# Patient Record
Sex: MALE | Race: OTHER | Employment: FULL TIME | URBAN - METROPOLITAN AREA
[De-identification: names, ages, dates, MRNs, and addresses within clinical notes are randomized per-mention and may not be internally consistent; named-entity substitution may affect disease eponyms.]

---

## 2017-01-25 ENCOUNTER — APPOINTMENT (OUTPATIENT)
Dept: GENERAL RADIOLOGY | Age: 47
End: 2017-01-25
Attending: EMERGENCY MEDICINE
Payer: SELF-PAY

## 2017-01-25 ENCOUNTER — APPOINTMENT (OUTPATIENT)
Dept: CT IMAGING | Age: 47
End: 2017-01-25
Attending: EMERGENCY MEDICINE
Payer: SELF-PAY

## 2017-01-25 ENCOUNTER — APPOINTMENT (OUTPATIENT)
Dept: ULTRASOUND IMAGING | Age: 47
End: 2017-01-25
Attending: EMERGENCY MEDICINE
Payer: SELF-PAY

## 2017-01-25 ENCOUNTER — HOSPITAL ENCOUNTER (EMERGENCY)
Age: 47
Discharge: HOME | End: 2017-01-25
Attending: EMERGENCY MEDICINE
Payer: SELF-PAY

## 2017-01-25 VITALS
TEMPERATURE: 98.7 F | HEART RATE: 79 BPM | RESPIRATION RATE: 15 BRPM | DIASTOLIC BLOOD PRESSURE: 88 MMHG | SYSTOLIC BLOOD PRESSURE: 137 MMHG | OXYGEN SATURATION: 95 %

## 2017-01-25 DIAGNOSIS — R11.2 NAUSEA AND VOMITING, INTRACTABILITY OF VOMITING NOT SPECIFIED, UNSPECIFIED VOMITING TYPE: ICD-10-CM

## 2017-01-25 DIAGNOSIS — E86.0 DEHYDRATION: ICD-10-CM

## 2017-01-25 DIAGNOSIS — R10.11 ABDOMINAL PAIN, RIGHT UPPER QUADRANT: ICD-10-CM

## 2017-01-25 DIAGNOSIS — R10.12 ABDOMINAL PAIN, LUQ (LEFT UPPER QUADRANT): Primary | ICD-10-CM

## 2017-01-25 DIAGNOSIS — G47.00 INSOMNIA, UNSPECIFIED TYPE: ICD-10-CM

## 2017-01-25 LAB
ALBUMIN SERPL BCP-MCNC: 3.8 G/DL (ref 3.5–5)
ALBUMIN/GLOB SERPL: 0.9 {RATIO} (ref 1.1–2.2)
ALP SERPL-CCNC: 101 U/L (ref 45–117)
ALT SERPL-CCNC: 32 U/L (ref 12–78)
ANION GAP BLD CALC-SCNC: 16 MMOL/L (ref 5–15)
APPEARANCE UR: CLEAR
APTT PPP: 26.6 SEC (ref 22.1–32.5)
AST SERPL W P-5'-P-CCNC: 29 U/L (ref 15–37)
BASOPHILS # BLD AUTO: 0 K/UL (ref 0–0.1)
BASOPHILS # BLD: 0 % (ref 0–1)
BILIRUB SERPL-MCNC: 0.6 MG/DL (ref 0.2–1)
BILIRUB UR QL: NEGATIVE
BUN SERPL-MCNC: 13 MG/DL (ref 6–20)
BUN/CREAT SERPL: 14 (ref 12–20)
CALCIUM SERPL-MCNC: 8.5 MG/DL (ref 8.5–10.1)
CHLORIDE SERPL-SCNC: 101 MMOL/L (ref 97–108)
CK MB CFR SERPL CALC: 0.8 % (ref 0–2.5)
CK MB SERPL-MCNC: 2.7 NG/ML (ref 5–25)
CK SERPL-CCNC: 349 U/L (ref 39–308)
CO2 SERPL-SCNC: 23 MMOL/L (ref 21–32)
COLOR UR: NORMAL
CREAT SERPL-MCNC: 0.93 MG/DL (ref 0.7–1.3)
EOSINOPHIL # BLD: 0.1 K/UL (ref 0–0.4)
EOSINOPHIL NFR BLD: 1 % (ref 0–7)
ERYTHROCYTE [DISTWIDTH] IN BLOOD BY AUTOMATED COUNT: 15.6 % (ref 11.5–14.5)
ETHANOL SERPL-MCNC: 138 MG/DL
GLOBULIN SER CALC-MCNC: 4.4 G/DL (ref 2–4)
GLUCOSE SERPL-MCNC: 123 MG/DL (ref 65–100)
GLUCOSE UR STRIP.AUTO-MCNC: NEGATIVE MG/DL
HCT VFR BLD AUTO: 40 % (ref 36.6–50.3)
HGB BLD-MCNC: 14.2 G/DL (ref 12.1–17)
HGB UR QL STRIP: NEGATIVE
INR PPP: 1.1 (ref 0.9–1.1)
KETONES UR QL STRIP.AUTO: NEGATIVE MG/DL
LACTATE SERPL-SCNC: 2.2 MMOL/L (ref 0.4–2)
LACTATE SERPL-SCNC: 3.2 MMOL/L (ref 0.4–2)
LEUKOCYTE ESTERASE UR QL STRIP.AUTO: NEGATIVE
LIPASE SERPL-CCNC: 101 U/L (ref 73–393)
LYMPHOCYTES # BLD AUTO: 36 % (ref 12–49)
LYMPHOCYTES # BLD: 2.1 K/UL (ref 0.8–3.5)
MCH RBC QN AUTO: 28.3 PG (ref 26–34)
MCHC RBC AUTO-ENTMCNC: 35.5 G/DL (ref 30–36.5)
MCV RBC AUTO: 79.8 FL (ref 80–99)
MONOCYTES # BLD: 0.5 K/UL (ref 0–1)
MONOCYTES NFR BLD AUTO: 8 % (ref 5–13)
NEUTS SEG # BLD: 3.1 K/UL (ref 1.8–8)
NEUTS SEG NFR BLD AUTO: 55 % (ref 32–75)
NITRITE UR QL STRIP.AUTO: NEGATIVE
PH UR STRIP: 5.5 [PH] (ref 5–8)
PLATELET # BLD AUTO: 244 K/UL (ref 150–400)
POTASSIUM SERPL-SCNC: 3.4 MMOL/L (ref 3.5–5.1)
PROT SERPL-MCNC: 8.2 G/DL (ref 6.4–8.2)
PROT UR STRIP-MCNC: NEGATIVE MG/DL
PROTHROMBIN TIME: 11.6 SEC (ref 9–11.1)
RBC # BLD AUTO: 5.01 M/UL (ref 4.1–5.7)
SODIUM SERPL-SCNC: 140 MMOL/L (ref 136–145)
SP GR UR REFRACTOMETRY: 1.01 (ref 1–1.03)
THERAPEUTIC RANGE,PTTT: NORMAL SECS (ref 58–77)
TROPONIN I SERPL-MCNC: <0.04 NG/ML
UROBILINOGEN UR QL STRIP.AUTO: 0.2 EU/DL (ref 0.2–1)
WBC # BLD AUTO: 5.7 K/UL (ref 4.1–11.1)

## 2017-01-25 PROCEDURE — 36415 COLL VENOUS BLD VENIPUNCTURE: CPT | Performed by: EMERGENCY MEDICINE

## 2017-01-25 PROCEDURE — 80053 COMPREHEN METABOLIC PANEL: CPT | Performed by: EMERGENCY MEDICINE

## 2017-01-25 PROCEDURE — 83605 ASSAY OF LACTIC ACID: CPT | Performed by: EMERGENCY MEDICINE

## 2017-01-25 PROCEDURE — 96374 THER/PROPH/DIAG INJ IV PUSH: CPT

## 2017-01-25 PROCEDURE — 84484 ASSAY OF TROPONIN QUANT: CPT | Performed by: EMERGENCY MEDICINE

## 2017-01-25 PROCEDURE — 96375 TX/PRO/DX INJ NEW DRUG ADDON: CPT

## 2017-01-25 PROCEDURE — 99285 EMERGENCY DEPT VISIT HI MDM: CPT

## 2017-01-25 PROCEDURE — 80307 DRUG TEST PRSMV CHEM ANLYZR: CPT | Performed by: EMERGENCY MEDICINE

## 2017-01-25 PROCEDURE — 82550 ASSAY OF CK (CPK): CPT | Performed by: EMERGENCY MEDICINE

## 2017-01-25 PROCEDURE — 74011636320 HC RX REV CODE- 636/320: Performed by: EMERGENCY MEDICINE

## 2017-01-25 PROCEDURE — 74011250636 HC RX REV CODE- 250/636

## 2017-01-25 PROCEDURE — 93005 ELECTROCARDIOGRAM TRACING: CPT

## 2017-01-25 PROCEDURE — 74022 RADEX COMPL AQT ABD SERIES: CPT

## 2017-01-25 PROCEDURE — 74177 CT ABD & PELVIS W/CONTRAST: CPT

## 2017-01-25 PROCEDURE — 85730 THROMBOPLASTIN TIME PARTIAL: CPT | Performed by: EMERGENCY MEDICINE

## 2017-01-25 PROCEDURE — 83690 ASSAY OF LIPASE: CPT | Performed by: EMERGENCY MEDICINE

## 2017-01-25 PROCEDURE — 74011250636 HC RX REV CODE- 250/636: Performed by: EMERGENCY MEDICINE

## 2017-01-25 PROCEDURE — 96361 HYDRATE IV INFUSION ADD-ON: CPT

## 2017-01-25 PROCEDURE — 85610 PROTHROMBIN TIME: CPT | Performed by: EMERGENCY MEDICINE

## 2017-01-25 PROCEDURE — 76700 US EXAM ABDOM COMPLETE: CPT

## 2017-01-25 PROCEDURE — 96376 TX/PRO/DX INJ SAME DRUG ADON: CPT

## 2017-01-25 PROCEDURE — 81003 URINALYSIS AUTO W/O SCOPE: CPT | Performed by: EMERGENCY MEDICINE

## 2017-01-25 PROCEDURE — 85025 COMPLETE CBC W/AUTO DIFF WBC: CPT | Performed by: EMERGENCY MEDICINE

## 2017-01-25 RX ORDER — SODIUM CHLORIDE 9 MG/ML
50 INJECTION, SOLUTION INTRAVENOUS
Status: COMPLETED | OUTPATIENT
Start: 2017-01-25 | End: 2017-01-25

## 2017-01-25 RX ORDER — ONDANSETRON 2 MG/ML
4 INJECTION INTRAMUSCULAR; INTRAVENOUS
Status: COMPLETED | OUTPATIENT
Start: 2017-01-25 | End: 2017-01-25

## 2017-01-25 RX ORDER — KETOROLAC TROMETHAMINE 30 MG/ML
30 INJECTION, SOLUTION INTRAMUSCULAR; INTRAVENOUS
Status: COMPLETED | OUTPATIENT
Start: 2017-01-25 | End: 2017-01-25

## 2017-01-25 RX ORDER — MORPHINE SULFATE 4 MG/ML
INJECTION, SOLUTION INTRAMUSCULAR; INTRAVENOUS
Status: COMPLETED
Start: 2017-01-25 | End: 2017-01-25

## 2017-01-25 RX ORDER — FAMOTIDINE 20 MG/1
20 TABLET, FILM COATED ORAL 2 TIMES DAILY
Qty: 20 TAB | Refills: 0 | Status: SHIPPED | OUTPATIENT
Start: 2017-01-25

## 2017-01-25 RX ORDER — DICYCLOMINE HYDROCHLORIDE 20 MG/1
20 TABLET ORAL EVERY 6 HOURS
Qty: 20 TAB | Refills: 0 | Status: SHIPPED | OUTPATIENT
Start: 2017-01-25 | End: 2017-01-30

## 2017-01-25 RX ORDER — HYDROCODONE BITARTRATE AND ACETAMINOPHEN 5; 325 MG/1; MG/1
1 TABLET ORAL
Qty: 20 TAB | Refills: 0 | Status: SHIPPED | OUTPATIENT
Start: 2017-01-25

## 2017-01-25 RX ORDER — SODIUM CHLORIDE 0.9 % (FLUSH) 0.9 %
10 SYRINGE (ML) INJECTION
Status: COMPLETED | OUTPATIENT
Start: 2017-01-25 | End: 2017-01-25

## 2017-01-25 RX ORDER — MORPHINE SULFATE 4 MG/ML
4 INJECTION, SOLUTION INTRAMUSCULAR; INTRAVENOUS
Status: COMPLETED | OUTPATIENT
Start: 2017-01-25 | End: 2017-01-25

## 2017-01-25 RX ORDER — ONDANSETRON 4 MG/1
4 TABLET, ORALLY DISINTEGRATING ORAL
Qty: 10 TAB | Refills: 0 | Status: SHIPPED | OUTPATIENT
Start: 2017-01-25

## 2017-01-25 RX ADMIN — SODIUM CHLORIDE 1000 ML: 900 INJECTION, SOLUTION INTRAVENOUS at 17:22

## 2017-01-25 RX ADMIN — ONDANSETRON 4 MG: 2 INJECTION INTRAMUSCULAR; INTRAVENOUS at 21:32

## 2017-01-25 RX ADMIN — SODIUM CHLORIDE 1000 ML: 900 INJECTION, SOLUTION INTRAVENOUS at 18:43

## 2017-01-25 RX ADMIN — DIATRIZOATE MEGLUMINE AND DIATRIZOATE SODIUM 30 ML: 600; 100 SOLUTION ORAL; RECTAL at 19:59

## 2017-01-25 RX ADMIN — Medication 10 ML: at 22:07

## 2017-01-25 RX ADMIN — SODIUM CHLORIDE 50 ML/HR: 900 INJECTION, SOLUTION INTRAVENOUS at 22:05

## 2017-01-25 RX ADMIN — IOPAMIDOL 100 ML: 755 INJECTION, SOLUTION INTRAVENOUS at 22:05

## 2017-01-25 RX ADMIN — ONDANSETRON 4 MG: 2 INJECTION INTRAMUSCULAR; INTRAVENOUS at 17:22

## 2017-01-25 RX ADMIN — KETOROLAC TROMETHAMINE 30 MG: 30 INJECTION, SOLUTION INTRAMUSCULAR at 17:22

## 2017-01-25 RX ADMIN — MORPHINE SULFATE 4 MG: 4 INJECTION, SOLUTION INTRAMUSCULAR; INTRAVENOUS at 21:32

## 2017-01-25 NOTE — ED NOTES
Pt placed in the room and placed in gown. Pt states he has had n/v and abdominal pain x 1 week. Pt states he has had approx 10 beers today and has h/o DM, per interpretor.

## 2017-01-25 NOTE — ED NOTES
Unable to communicate with pt, pt does not speak any english. Will move pt into room 22 once it is available in order to plug in blue phone to better communicate with the pt.

## 2017-01-25 NOTE — LETTER
[address] 
[facility] EMERGENCY DEPT 
[address] [place] [address] [address] [id number] [phone] Work/School Note Date: 1/25/2017 To Whom It May concern: 
 
Lee Barber was seen and treated today in the emergency room by the following provider(s): 
Attending Provider: vEer Santiago MD. Lee Barber may return to work on 01/27/2017. Sincerely, Ever Santiago MD

## 2017-01-25 NOTE — ED PROVIDER NOTES
HPI Comments: Wally Avila, 40 y.o. Male with PMHx of DM presents via EMS to ED [facility] ED with cc of severe epigastric pain x 1 week. Pt does not speak English, Dr. Dc Wong was able to communicate with the pt in Sudanese. Per EMS, pt reports drinking beer today. Pt notes that the pain has worsened significantly today, with an episode of vomiting, prompting him to seek treatment. Pt also c/o of slight CP and a subjective fever. Per EMS, pt's BG was 115 and his vitals were stable en route. Pt confirms hx of DM and Pt denies any diarrhea, SOB, chills, back pain, dysuria, cough, or congestion. Social history significant for: - Tobacco, - EtOH, - Illicit Drug Use    There are no other complaints, changes, or physical findings at this time. Written by Vinnie Castillo ED Scribe, as dictated by Yesica Shields MD.      The history is provided by the patient. No  was used (Dr. Dc Wong spoke with the pt in [facility] ). No past medical history on file. No past surgical history on file. No family history on file. Social History     Social History    Marital status: SINGLE     Spouse name: N/A    Number of children: N/A    Years of education: N/A     Occupational History    Not on file. Social History Main Topics    Smoking status: Never Smoker    Smokeless tobacco: Not on file    Alcohol use No    Drug use: No    Sexual activity: Not on file     Other Topics Concern    Not on file     Social History Narrative         ALLERGIES: Review of patient's allergies indicates no known allergies. Review of Systems   Constitutional: Positive for fever. Negative for chills. HENT: Negative. Negative for congestion. Eyes: Negative. Respiratory: Negative. Negative for cough and shortness of breath. Cardiovascular: Positive for chest pain. Gastrointestinal: Positive for abdominal pain, nausea and vomiting. Endocrine: Negative. Negative for heat intolerance. Genitourinary: Negative. Negative for dysuria. Musculoskeletal: Negative. Negative for back pain. Skin: Negative for pallor. Allergic/Immunologic: Negative. Negative for immunocompromised state. Neurological: Negative. Hematological: Negative. Does not bruise/bleed easily. Psychiatric/Behavioral: Negative. All other systems reviewed and are negative. Patient Vitals for the past 12 hrs:   Temp Pulse Resp BP SpO2   01/25/17 2230 - 79 - 137/88 95 %   01/25/17 2224 - 91 - 133/83 95 %   01/25/17 2030 - 77 15 129/87 97 %   01/25/17 1934 - 68 14 - 96 %   01/25/17 1933 - - - 132/81 -   01/25/17 1719 - 77 14 (!) 137/95 97 %   01/25/17 1610 98.7 °F (37.1 °C) 82 16 137/88 96 %         Physical Exam   Constitutional: He is oriented to person, place, and time. He appears well-developed and well-nourished. He appears distressed (moderate). HENT:   Head: Normocephalic and atraumatic. Eyes: EOM are normal. Pupils are equal, round, and reactive to light. Neck: Normal range of motion. Neck supple. Cardiovascular: Normal rate, regular rhythm and normal heart sounds. Pulmonary/Chest: Effort normal and breath sounds normal. He has no wheezes. Abdominal: Soft. Bowel sounds are normal. There is tenderness (epigastrium > upper quadrants ). Musculoskeletal: Normal range of motion. He exhibits no edema or tenderness. Neurological: He is alert and oriented to person, place, and time. No cranial nerve deficit. Skin: Skin is warm and dry. Psychiatric: He has a normal mood and affect. Nursing note and vitals reviewed. MDM  Number of Diagnoses or Management Options  Abdominal pain, LUQ (left upper quadrant):    Abdominal pain, right upper quadrant:   Dehydration:   Insomnia, unspecified type:   Nausea and vomiting, intractability of vomiting not specified, unspecified vomiting type:   Diagnosis management comments: DDx: pancreatitis, biliary colic, dehydration, UTI, CAD, DKA       Amount and/or Complexity of Data Reviewed  Clinical lab tests: ordered and reviewed  Tests in the radiology section of CPT®: ordered and reviewed  Tests in the medicine section of CPT®: reviewed and ordered  Obtain history from someone other than the patient: yes (EMS)  Review and summarize past medical records: yes  Independent visualization of images, tracings, or specimens: yes    Patient Progress  Patient progress: stable    ED Course       Procedures    EKG interpretation: (Preliminary) 17:07  Rhythm: normal sinus rhythm; and regular . Rate (approx.): 80; Axis: normal; DC interval: normal; QRS interval: normal ; ST/T wave: normal; Other findings: normal, no prior EKG's. Written by SHRUTHI Villanueva, as dictated by John Mahoney MD.    6:42 PM  Pt has been reevaluated and is feeling better  Written by SHRUTHI Villanueva, as dictated by John Mahoney MD.       9:20 PM  Pt's pain has worsened and he is now nauseated.   Written by SHRUTHI Villanueva, as dictated by John Mahoney MD.    LABORATORY TESTS:  Recent Results (from the past 12 hour(s))   EKG, 12 LEAD, INITIAL    Collection Time: 01/25/17  5:07 PM   Result Value Ref Range    Ventricular Rate 80 BPM    Atrial Rate 80 BPM    P-R Interval 150 ms    QRS Duration 98 ms    Q-T Interval 394 ms    QTC Calculation (Bezet) 454 ms    Calculated P Axis 31 degrees    Calculated R Axis 32 degrees    Calculated T Axis -3 degrees    Diagnosis       Normal sinus rhythm  Normal ECG  No previous ECGs available     CBC WITH AUTOMATED DIFF    Collection Time: 01/25/17  5:21 PM   Result Value Ref Range    WBC 5.7 4.1 - 11.1 K/uL    RBC 5.01 4.10 - 5.70 M/uL    HGB 14.2 12.1 - 17.0 g/dL    HCT 40.0 36.6 - 50.3 %    MCV 79.8 (L) 80.0 - 99.0 FL    MCH 28.3 26.0 - 34.0 PG    MCHC 35.5 30.0 - 36.5 g/dL    RDW 15.6 (H) 11.5 - 14.5 %    PLATELET 248 331 - 140 K/uL    NEUTROPHILS 55 32 - 75 %    LYMPHOCYTES 36 12 - 49 %    MONOCYTES 8 5 - 13 %    EOSINOPHILS 1 0 - 7 %    BASOPHILS 0 0 - 1 %    ABS. NEUTROPHILS 3.1 1.8 - 8.0 K/UL    ABS. LYMPHOCYTES 2.1 0.8 - 3.5 K/UL    ABS. MONOCYTES 0.5 0.0 - 1.0 K/UL    ABS. EOSINOPHILS 0.1 0.0 - 0.4 K/UL    ABS. BASOPHILS 0.0 0.0 - 0.1 K/UL   METABOLIC PANEL, COMPREHENSIVE    Collection Time: 01/25/17  5:21 PM   Result Value Ref Range    Sodium 140 136 - 145 mmol/L    Potassium 3.4 (L) 3.5 - 5.1 mmol/L    Chloride 101 97 - 108 mmol/L    CO2 23 21 - 32 mmol/L    Anion gap 16 (H) 5 - 15 mmol/L    Glucose 123 (H) 65 - 100 mg/dL    BUN 13 6 - 20 MG/DL    Creatinine 0.93 0.70 - 1.30 MG/DL    BUN/Creatinine ratio 14 12 - 20      GFR est AA >60 >60 ml/min/1.73m2    GFR est non-AA >60 >60 ml/min/1.73m2    Calcium 8.5 8.5 - 10.1 MG/DL    Bilirubin, total 0.6 0.2 - 1.0 MG/DL    ALT 32 12 - 78 U/L    AST 29 15 - 37 U/L    Alk.  phosphatase 101 45 - 117 U/L    Protein, total 8.2 6.4 - 8.2 g/dL    Albumin 3.8 3.5 - 5.0 g/dL    Globulin 4.4 (H) 2.0 - 4.0 g/dL    A-G Ratio 0.9 (L) 1.1 - 2.2     PROTHROMBIN TIME + INR    Collection Time: 01/25/17  5:21 PM   Result Value Ref Range    INR 1.1 0.9 - 1.1      Prothrombin time 11.6 (H) 9.0 - 11.1 sec   PTT    Collection Time: 01/25/17  5:21 PM   Result Value Ref Range    aPTT 26.6 22.1 - 32.5 sec    aPTT, therapeutic range     58.0 - 77.0 SECS   LIPASE    Collection Time: 01/25/17  5:21 PM   Result Value Ref Range    Lipase 101 73 - 393 U/L   LACTIC ACID, PLASMA    Collection Time: 01/25/17  5:21 PM   Result Value Ref Range    Lactic acid 3.2 (HH) 0.4 - 2.0 MMOL/L   URINALYSIS W/ RFLX MICROSCOPIC    Collection Time: 01/25/17  5:21 PM   Result Value Ref Range    Color YELLOW/STRAW      Appearance CLEAR CLEAR      Specific gravity 1.007 1.003 - 1.030      pH (UA) 5.5 5.0 - 8.0      Protein NEGATIVE  NEG mg/dL    Glucose NEGATIVE  NEG mg/dL    Ketone NEGATIVE  NEG mg/dL    Bilirubin NEGATIVE  NEG      Blood NEGATIVE  NEG      Urobilinogen 0.2 0.2 - 1.0 EU/dL    Nitrites NEGATIVE  NEG      Leukocyte Esterase NEGATIVE  NEG     ETHYL ALCOHOL    Collection Time: 01/25/17  5:21 PM   Result Value Ref Range    ALCOHOL(ETHYL),SERUM 138 (H) <10 MG/DL   CK W/ CKMB & INDEX    Collection Time: 01/25/17  5:21 PM   Result Value Ref Range     (H) 39 - 308 U/L    CK - MB 2.7 <3.6 NG/ML    CK-MB Index 0.8 0 - 2.5     TROPONIN I    Collection Time: 01/25/17  5:21 PM   Result Value Ref Range    Troponin-I, Qt. <0.04 <0.05 ng/mL   LACTIC ACID, PLASMA    Collection Time: 01/25/17  9:36 PM   Result Value Ref Range    Lactic acid 2.2 (HH) 0.4 - 2.0 MMOL/L       IMAGING RESULTS:  CT ABD PELV W CONT   Final Result   INDICATION: Nausea, vomiting, abdominal pain for one week     COMPARISON: None     TECHNIQUE:   Following the uneventful intravenous administration of 100 cc Isovue-370, thin  axial images were obtained through the abdomen and pelvis. Coronal and sagittal  reconstructions were generated. Oral contrast was administered. CT dose  reduction was achieved through use of a standardized protocol tailored for this  examination and automatic exposure control for dose modulation.     FINDINGS: Examination is limited by respiratory motion artifact. LUNG BASES: Clear. INCIDENTALLY IMAGED HEART AND MEDIASTINUM: Unremarkable. LIVER: There is diffuse fatty infiltration of the liver. GALLBLADDER: Unremarkable. SPLEEN: No mass. PANCREAS: No mass or ductal dilatation. ADRENALS: Unremarkable. KIDNEYS: No mass, calculus, or hydronephrosis. STOMACH: Unremarkable. SMALL BOWEL: No dilatation or wall thickening. COLON: No dilatation or wall thickening. APPENDIX: Unremarkable. PERITONEUM: No ascites or pneumoperitoneum. RETROPERITONEUM: No lymphadenopathy or aortic aneurysm. REPRODUCTIVE ORGANS: Prostate is noted. URINARY BLADDER: No mass or calculus. BONES: No destructive bone lesion. ADDITIONAL COMMENTS: N/A     IMPRESSION  IMPRESSION:  No acute abdominal or pelvic pathology. Diffuse fatty infiltration of the liver.    US ABD COMP Final Result   EXAM: US ABD COMP      INDICATION: Nausea, vomiting and abdominal pain.     COMPARISON: None.     TECHNIQUE:   Real-time sonography of the abdomen was performed with multiple static images of  the liver, gallbladder, pancreas, spleen, kidneys and retroperitoneum obtained.     FINDINGS:  LIVER:   The liver is normal in echotexture with no mass or other focal abnormality.      LIVER VASCULATURE:   The portal vein flow is towards the liver.     GALLBLADDER:  The gallbladder is normal and no stones are identified. There is no wall  thickening or fluid around the gallbladder.      COMMON BILE DUCT:  There is no biliary duct dilatation and the common duct measures 4 mm in  diameter.      PANCREAS:  The visualized pancreas is normal.     SPLEEN:  The spleen is normal in echotexture and size and measures 9.3 cm in length.     RIGHT KIDNEY:  The right kidney demonstrates normal echogenicity with no mass, stone or  hydronephrosis. The right kidney measures 10.1 cm in length.     LEFT KIDNEY:  The left kidney demonstrates normal echogenicity with no mass, stone or  hydronephrosis. The left kidney measures 10.2 cm in length.      RETROPERITONEUM:  The aorta tapers normally. The IVC is normal.  No retroperitoneal mass is identified.     IMPRESSION  IMPRESSION: Normal abdominal ultrasound examination.      XR ABD ACUTE W 1 V CHEST   Final Result   INDICATION: pain     EXAM: ACUTE ABDOMINAL SERIES      COMPARISON: None     FINDINGS:  Single view of the chest demonstrates a normal cardiomediastinal silhouette. The lungs are well expanded and clear. There is no free intraperitoneal air. Supine and upright/decubitus views of the abdomen demonstrate a few prominent  but not significantly dilated small bowel loops and fluid levels in the left  midabdomen. There are no abnormal calcifications. The osseous structures are  unremarkable.     IMPRESSION  IMPRESSION:  Nonspecific bowel gas pattern. No acute process. MEDICATIONS GIVEN:  Medications   sodium chloride 0.9 % bolus infusion 1,000 mL (0 mL IntraVENous IV Completed 1/25/17 1822)   ondansetron (ZOFRAN) injection 4 mg (4 mg IntraVENous Given 1/25/17 1722)   ketorolac (TORADOL) injection 30 mg (30 mg IntraVENous Given 1/25/17 1722)   sodium chloride 0.9 % bolus infusion 1,000 mL (0 mL IntraVENous IV Completed 1/25/17 2247)   diatrizoate meglumine-d.sodium (MD-GASTROVIEW,GASTROGRAFIN) 66-10 % contrast solution 30 mL (30 mL Oral Given 1/25/17 1959)   0.9% sodium chloride infusion (50 mL/hr IntraVENous New Bag 1/25/17 2205)   iopamidol (ISOVUE-370) 76 % injection 100 mL (100 mL IntraVENous Given 1/25/17 2205)   sodium chloride (NS) flush 10 mL (10 mL IntraVENous Given 1/25/17 2207)   morphine injection 4 mg (4 mg IntraVENous Given 1/25/17 2132)   ondansetron (ZOFRAN) injection 4 mg (4 mg IntraVENous Given 1/25/17 2132)       IMPRESSION:  1. Abdominal pain, LUQ (left upper quadrant)    2. Abdominal pain, right upper quadrant    3. Nausea and vomiting, intractability of vomiting not specified, unspecified vomiting type    4. Insomnia, unspecified type    5. Dehydration        PLAN:  1. Current Discharge Medication List      START taking these medications    Details   HYDROcodone-acetaminophen (NORCO) 5-325 mg per tablet Take 1 Tab by mouth every four (4) hours as needed for Pain. Max Daily Amount: 6 Tabs. Qty: 20 Tab, Refills: 0      dicyclomine (BENTYL) 20 mg tablet Take 1 Tab by mouth every six (6) hours for 20 doses. Qty: 20 Tab, Refills: 0      famotidine (PEPCID) 20 mg tablet Take 1 Tab by mouth two (2) times a day. Qty: 20 Tab, Refills: 0      ondansetron (ZOFRAN ODT) 4 mg disintegrating tablet Take 1 Tab by mouth every eight (8) hours as needed for Nausea.   Qty: 10 Tab, Refills: 0         CONTINUE these medications which have NOT CHANGED    Details   glipiZIDE (GLUCOTROL) 10 mg tablet 1/2 tab before breakfast.  Qty: 1 Tab, Refills: 0      metFORMIN ER (GLUCOPHAGE XR) 500 mg tablet Si po with dinner. Italian please  Qty: 60 Tab, Refills: 2    Associated Diagnoses: Hyperglycemia due to type 2 diabetes mellitus (HCC)      lovastatin (MEVACOR) 20 mg tablet Take 1 Tab by mouth nightly. For cholesterol; Octavia 1 tab en la noche para colesterol  Qty: 30 Tab, Refills: 2    Associated Diagnoses: Pure hypercholesterolemia           2. Follow-up Information     Follow up With Details Comments Contact Info    see a doctor  In 2 days As needed     Nilda Kwong MD  As needed [address]  [phone]      [facility] EMERGENCY DEPT  If symptoms worsen [address]  [address] [facility] [address] [facility]  [phone]        Return to ED if worse     Discharge Note:  10:56 PM  The pt is ready for discharge. The pt's signs, symptoms, diagnosis, and discharge instructions have been discussed and pt has conveyed their understanding. The pt is to follow up as recommended or return to ER should their symptoms worsen. Plan has been discussed and pt is in agreement. This note is prepared by Reymundo Trotter, acting as a Scribe for Ofelia Boland MD.    Ofelia Boland MD: The scribe's documentation has been prepared under my direction and personally reviewed by me in its entirety. I confirm that the notes above accurately reflects all work, treatment, procedures, and medical decision making performed by me.

## 2017-01-26 LAB
ATRIAL RATE: 80 BPM
CALCULATED P AXIS, ECG09: 31 DEGREES
CALCULATED R AXIS, ECG10: 32 DEGREES
CALCULATED T AXIS, ECG11: -3 DEGREES
DIAGNOSIS, 93000: NORMAL
P-R INTERVAL, ECG05: 150 MS
Q-T INTERVAL, ECG07: 394 MS
QRS DURATION, ECG06: 98 MS
QTC CALCULATION (BEZET), ECG08: 454 MS
VENTRICULAR RATE, ECG03: 80 BPM

## 2017-01-26 NOTE — ED NOTES
___Smith___________________ in to talk with patient and explain plan of care with  understanding and  written & verbal instructions.

## 2017-01-26 NOTE — DISCHARGE INSTRUCTIONS
Deshidratación: Instrucciones de cuidado - [ Dehydration: Care Instructions ]  Instrucciones de cuidado  La deshidratación ocurre cuando el cuerpo pierde demasiado líquido. Puede ocurrir cuando usted no nerissa suficiente agua o pierde grandes cantidades de líquidos del organismo debido a la diarrea, el vómito o la sudoración. La deshidratación grave puede ser mortal.  El agua y los minerales llamados electrolitos ayudan a recuperar el equilibrio de los líquidos en el organismo. Aprenda las señales tempranas de pérdida de líquido y adrianne más líquidos para prevenir la deshidratación. La atención de seguimiento es katelin parte clave de rogers tratamiento y seguridad. Asegúrese de hacer y acudir a todas las citas, y llame a rogers médico si está teniendo problemas. También es katelin buena idea saber los resultados de los exámenes y mantener katelin lista de los medicamentos que kayla. ¿Cómo puede cuidarse en el hogar? · Para prevenir la deshidratación, adrianne abundantes líquidos, los suficientes wali para que rogers orina sea de color amarillo noelle o transparente wali el agua. Elija beber agua y otros líquidos aileen sin cafeína hasta que se sienta mejor. Si tiene [place] [employer] [facility], del corazón o del hígado y tiene que [place]'s líquidos, hable con rogers médico antes de aumentar rogers consumo. · Si no siente ganas de comer o beber, trate de antolin sorbos pequeños de agua, bebidas deportivas u otras bebidas rehidratantes. · Descanse lo suficiente. Cómo prevenir la deshidratación  · Daniel Rumpf líquidos a rogers Yen Peralta y rogers rutina diaria, a menos que rogers médico le haya dicho lo contrario. · Adrianne más líquidos cuando angelika calor. Adrianne aún más líquidos si hace mucho ejercicio. No tome bebidas con alcohol o cafeína. · Esté alerta a los síntomas de deshidratación. Estos incluyen:  ¨ Boca seca y pegajosa. ¨ Jorge Hector, y en poca cantidad. ¨ Ojos secos y hundidos. ¨ Sentirse muy cansado.   · Aprenda qué problemas pueden llevar a la deshidratación. Estos incluyen:  ¨ Diarrea, fiebre y vómito. ¨ Cualquier enfermedad con fiebre, wali la neumonía o la gripe. ¨ Actividades que causan mucha sudoración, wali odalys de resistencia y Alfonso Beer ignacio en exteriores en un clima cálido o húmedo. ¨ Abuso o síndrome de abstinencia del alcohol o las drogas. ¨ Ciertos medicamentos, wali pastillas para el resfriado y la alergia (antihistamínicos), pastillas para adelgazar (diuréticos) y laxantes. ¨ Ciertas enfermedades, wali la diabetes, el cáncer, y la enfermedad del corazón o [place]. ¿Cuándo debe pedir ayuda? Llame al 911 en cualquier momento que considere que necesita atención de [place]. Por ejemplo, llame si:  · Se desmayó (perdió el conocimiento). Llame a rogers médico ahora mismo o busque atención médica inmediata si:  · Se siente confuso y no puede pensar con claridad. · Siente mareos o aturdimiento, o que está a punto de [place]. · Tiene señales de necesitar más líquidos. Tiene los ojos hundidos, la boca seca y [place] solo poca cantidad de color oscuro. · No puede retener líquidos en el estómago. Preste especial atención a los cambios en rogers anh y asegúrese de comunicarse con rogers médico si:  · No produce lágrimas. · Tiene la piel muy seca y esta regresa despacio a rogers lugar después de pellizcarla. · Tiene la boca y los ojos muy secos. ¿Dónde puede encontrar más información en inglés? Penne Sanjiv a http://cee.info/. Rosalynd Life A699 en la búsqueda para aprender Adriel arcelia de \"Deshidratación: Instrucciones de cuidado - [ Dehydration: Care Instructions ]. \"  Revisado: 27 cormier, 2016  Versión del contenido: 11.1  © 9999-2671 [employer]. Las instrucciones de cuidado fueron adaptadas bajo licencia por Good Help Connections (which disclaims liability or warranty for this information). Si usted tiene [place] Orma afección médica o sobre estas instrucciones, siempre pregunte a rogers profesional de anh. Healthwise, Incorporated niega toda garantía o responsabilidad por rogers uso de esta información. Insomnio: Instrucciones de cuidado - [ Insomnia: Care Instructions ]  Instrucciones de cuidado  El insomnio es la incapacidad para dormir kodi. Es un problema común para la mayoría de las personas en algún momento. El insomnio puede hacer que resulte difícil dormirse, permanecer dormido o dormir el tiempo necesario. Del Dios puede provocarle fatiga y mal humor manuel el día. También puede hacer que esté olvidadizo e infeliz y que sea menos eficiente en [place]. Algunos trastornos, [employer] depresión o la ansiedad, pueden provocar insomnio. El dolor también puede afectar rogers capacidad para dormir. Cuando se resuelven [facility], el insomnio generalmente desaparece. A veces, los malos hábitos de sueño pueden provocar insomnio. Si el insomnio le afecta en rogers trabajo o rogers capacidad para disfrutar la carisa, puede antolin medidas para mejorar el sueño. La atención de seguimiento es katelin parte clave de rogers tratamiento y seguridad. Asegúrese de hacer y acudir a todas las citas, y llame a rogers médico si está teniendo problemas. También es katelin buena idea saber los resultados de juancho exámenes y mantener katelin lista de los medicamentos que kayla. ¿Cómo puede cuidarse en el hogar? Qué evitar  · No tome bebidas con cafeína, wali café o té stephanie, manuel las 8 horas antes de [place]. · No fume ni use otros tipos de tabaco cerca de la hora de acostarse. La nicotina es un estimulante y puede mantenerlo despierto. · Evite beber alcohol por la noche, porque puede hacer que se despierte en la mitad de la noche. · No coma katelin comida abundante cerca de la hora de [place]. Si tiene hambre, coma algo liviano. · No tome mucha agua cerca de la hora de [place], porque la necesidad de orinar puede despertarlo manuel la noche. · No robin ni danay televisión en la cama. Use la cama solo para dormir y para tener relaciones sexuales.   Guillermo Crockett intentar  · Thersia Brakeman a dormir a la misma hora todas las noches y levántese a la misma hora cada mañana. No duerma la siesta manuel el día. · Mantenga rogers dormitorio silencioso, oscuro y fresco.  · Duerma con Ade Forester y un colchón cómodos. · Si mirar el reloj le causa ansiedad, gírelo de Rosangela que no pueda mt la hora. · Si tiene preocupaciones cuando se acuesta, lleve un diario de megan preocupaciones. [place]ante antes de la hora de [place], escriba las preocupaciones y luego deje de lado el diario y megan inquietudes. · Pruebe la meditación u otras técnicas de relajación antes de [place]. · Si no puede dormir, levántese y Beni Parody a otra habitación hasta que sienta sueño. Luís algo que lo relaje. Repita la rutina de acostarse antes de volver a la cama. · Luís que rogers hogar esté en silencio y en calma aproximadamente katelin hora antes de acostarse. Baje la intensidad de las luces, apague el televisor y la computadora, y baje el volumen de la [place]. [place] puede ayudarle a relajarse después de un día ajetreado. ¿Cuándo debe pedir ayuda? Preste especial atención a los cambios en rogers anh y asegúrese de comunicarse con rogers médico si:  · Megan esfuerzos por mejorar el sueño no dan resultado. · Rogers insomnio empeora. · Se ha estado sintiendo decaído, deprimido o desesperanzado, o ha perdido el interés por cosas que disfrutaba hacer. ¿Dónde puede encontrar más información en inglés? Thersia Brakeman a http://andreea-alba.info/. Escriba P513 en la búsqueda para aprender más acerca de \"Insomnio: Instrucciones de cuidado - [ Insomnia: Care Instructions ]. \"  Revisado: 26 julio, 2016  Versión del contenido: 11.1  © 7917-1215 Healthwise, Incorporated. Las instrucciones de cuidado fueron adaptadas bajo licencia por Good Help Connections (which disclaims liability or warranty for this information). Si usted tiene [place] afección médica o sobre estas instrucciones, siempre pregunte a rogers profesional de anh. [facility], Incorporated niega toda garantía o responsabilidad por rogers uso de esta información. Dolor abdominal: Instrucciones de cuidado - [ Abdominal Pain: Care Instructions ]  Instrucciones de cuidado    El dolor abdominal tiene muchas causas posibles. Algunas de ellas no son graves y mejoran por sí solas en unos días. Otras requieren Rena Thaxton y [place]. Si rogers dolor continúa o [place], necesitará katelin nueva revisión y [place] pruebas para determinar qué pasa. Es posible que necesite cirugía para corregir el problema. No ignore nuevos síntomas, wali fiebre, náuseas y [place], [facility], dolor que JOSE o Deanna. Podrían ser señales de un problema más grave. Rogers médico puede haberle recomendado katelin consulta de J Carlos Suarezn las 8 o 12 horas siguientes. Si no se siente mejor, es posible que requiera Rena Gary o [place]. El médico lo zuñiga revisado minuciosamente, sonny puede martha problemas más tarde. Si nota algún problema o síntomas nuevos, busque tratamiento médico inmediatamente. La atención de seguimiento es katelin parte clave de rogers tratamiento y seguridad. Asegúrese de hacer y acudir a todas las citas, y llame a rogers médico si está teniendo problemas. También es katelin buena idea saber los resultados de los exámenes y mantener katelin lista de los medicamentos que kayla. ¿Cómo puede cuidarse en el hogar? · Descanse hasta que se sienta mejor. · Para prevenir la deshidratación, adrianne abundantes líquidos, suficientes para que rogers orina sea de color amarillo noelle o transparente wali el agua. Elija beber agua y otros líquidos aileen sin cafeína hasta que se sienta mejor. Si tiene [place] [employer] [facility] [employer], del corazón o del hígado y tiene que [place]'s líquidos, hable con rogers médico antes de aumentar rogers consumo. · Si tiene [employer], coma alimentos suaves, wali arroz, pan david seco o galletas saladas, bananas (plátanos) y puré de [place].  Trate de comer varias comidas pequeñas al día en lugar de dos o ute grandes. · Espere hasta 48 horas después de que todos los síntomas hayan desaparecido antes de comer alimentos condimentados, alcohol y bebidas que contengan cafeína. · No consuma alimentos ricos en grasa. · Evite medicamentos antiinflamatorios wali aspirina, ibuprofeno (Advil, Motrin) y naproxeno (Aleve). Pueden causar [employer]. Dígale a rogers médico si está tomando aspirina diariamente debido a otro problema de anh. ¿Cuándo debe pedir ayuda? Llame al 911 en cualquier momento que considere que necesita atención de emergencia. Por ejemplo, llame si:  · Se desmayó (perdió el conocimiento). · Las heces son de color rojizo o muy sanguinolentas (con meagan). · Vomita meagan o algo parecido a granos de café molido. · Tiene dolor abdominal nuevo e intenso. Llame a rogers médico ahora mismo o busque atención médica inmediata si:  · Rogers dolor empeora, sobre todo si se concentra en katelin krys parte del vientre. · Vuelve a tener fiebre o tiene fiebre más steffany. · Megan heces son negruzcas y parecidas al alquitrán o tienen rastros de [place]. · Tiene sangrado vaginal inesperado. · Tiene síntomas de katelin infección del tracto urinario. Estos podrían incluir:  ¨ Dolor al [place]. ¨ Orinar con más frecuencia que lo habitual.  ¨ Meagan en la [place]. · Siente mareos o aturdimiento, o que está a punto de [place]. Preste especial atención a los cambios en rogers anh y asegúrese de comunicarse con rogers médico si:  · No está mejorando después de 1 día (24 horas). ¿Dónde puede encontrar más información en inglés? Kylee Burgess a http://andreea-alba.info/. Mahad Bonilla N717 en la búsqueda para aprender más acerca de \"Dolor abdominal: Instrucciones de cuidado - [ Abdominal Pain: Care Instructions ]. \"  Revisado: 27 [place], 2016  Versión del contenido: 11.1  © 6430-0070 [employer]orporated.  Las instrucciones de cuidado fueron adaptadas bajo licencia por Good Help Connections (which disclaims liability or warranty for this information). Si usted tiene [place] afección médica o sobre estas instrucciones, siempre pregunte a rogers profesional de anh. [facility], Incorporated niega toda garantía o responsabilidad por rogers uso de esta información. Náuseas y vómito: Instrucciones de cuidado - [ Nausea and Vomiting: Care Instructions ]  Instrucciones de cuidado    Cuando tiene náuseas, podría sentirse débil y sudoroso y notar mucha saliva en rogers boca. Las náuseas suelen [employer]. La mayoría de las veces no hay que preocuparse por las náuseas y [address] [facility], sonny estos pueden ser signos de [facility]. Dos causas comunes de náuseas y vómito son la gastroenteritis viral y la intoxicación por alimentos. Las náuseas y el vómito por gastroenteritis viral, por lo general, empiezan a mejorar en unas 24 horas. Las náuseas y el vómito debido a intoxicación por alimentos podrían durar de 12 a 48 horas. El médico lo ha revisado minuciosamente, sonny puede desarrollar problemas más tarde. Si nota algún problema o síntomas nuevos, busque tratamiento médico inmediatamente. La atención de seguimiento es katelin parte clave de rogers tratamiento y seguridad. Asegúrese de hacer y acudir a todas las citas, y llame a rogers médico si está teniendo problemas. También es katelin buena idea saber los resultados de los exámenes y mantener katelin lista de los medicamentos que kayla. ¿Cómo puede cuidarse en el hogar? · Para prevenir la deshidratación, adrianne abundantes líquidos, suficientes para que rogers orina sea de color amarillo noelle o ronaldo wali el agua. Opte por beber agua y otros líquidos aileen sin cafeína hasta que se sienta mejor. Si tiene katelin enfermedad del riñón, del corazón o del hígado y tiene que [place]'s líquidos, hable con rogers médico antes de aumentar rogers consumo. · Permanezca en la cama hasta que se sienta mejor.   · Cuando pueda comer, empiece a consumir sopas claras (caldos), alimentos suaves y líquidos hasta que todos los síntomas hayan desaparecido por un período de 12 a 48 horas. Otras elecciones buenas incluyen pan david seco, galletas saladas, cereal cocido y postre de gelatina, wali Jell-O.  ¿Cuándo debe pedir ayuda? Llame al 911 toda vez que piense que puede necesitar atención de emergencia. Por ejemplo, llame si:  · Se desmayó (perdió el conocimiento). Llame a rogers médico ahora mismo o busque atención médica inmediata si:  · Tiene síntomas de deshidratación, tales wali:  ¨ Ojos secos y boca seca. ¨ Orina solo poca cantidad de color oscuro. ¨ Tiene más sed de lo normal.  · Tiene dolor abdominal nuevo o que empeora. · Tiene fiebre nueva o que aumenta. · Vomita meagan o algo parecido a granos de café molido. Preste especial atención a los cambios en rogers anh y asegúrese de comunicarse con rogers médico si:  · Tiene náusea y vómito continuos. · El vómito está empeorando. · El vómito dura más de 2 días. · No mejora wali se esperaba. ¿Dónde puede encontrar más información en inglés? Bonny Wiggins a http://andreea-alba.info/. [place] H591 en la búsqueda para aprender más acerca de \"Náuseas y vómito: Instrucciones de cuidado - [ Nausea and Vomiting: Care Instructions ]. \"  [address]

## 2017-01-27 ENCOUNTER — PATIENT OUTREACH (OUTPATIENT)
Dept: FAMILY MEDICINE CLINIC | Age: 47
End: 2017-01-27

## 2017-03-01 ENCOUNTER — PATIENT OUTREACH (OUTPATIENT)
Dept: FAMILY MEDICINE CLINIC | Age: 47
End: 2017-03-01

## 2017-03-01 NOTE — PROGRESS NOTES
[address]ou[address]    Closing NOAH episode of care. Pt has not returned to see PCP since ED visit and letter was sent to pt.  He does have upcoming appointments at the [facility] with mental health counselor and with nutritionist. Russell Marin RN

## 2017-05-04 ENCOUNTER — OFFICE VISIT (OUTPATIENT)
Dept: FAMILY MEDICINE CLINIC | Age: 47
End: 2017-05-04

## 2017-05-04 ENCOUNTER — CLINICAL SUPPORT (OUTPATIENT)
Dept: FAMILY MEDICINE CLINIC | Age: 47
End: 2017-05-04

## 2017-05-04 DIAGNOSIS — Z71.3 DIETARY COUNSELING AND SURVEILLANCE: Primary | ICD-10-CM

## 2017-05-04 DIAGNOSIS — Z71.9 COUNSELED BY NURSE: Primary | ICD-10-CM

## 2017-05-04 DIAGNOSIS — F43.21 ADJUSTMENT DISORDER WITH DEPRESSED MOOD: Primary | ICD-10-CM

## 2017-05-04 DIAGNOSIS — E11.65 TYPE 2 DIABETES MELLITUS WITH HYPERGLYCEMIA, UNSPECIFIED LONG TERM INSULIN USE STATUS: ICD-10-CM

## 2017-05-04 NOTE — PROGRESS NOTES
INITIAL SESSION    [facility] was referred by his doctor for anger management. He is also being treated for diabetes. He stated in session that he no longer has the problem with the anger. Nevertheless, we explored his history. The anger issue has not been a problem for very long. He has not had any legal or relationship issues related to it. 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 [place], his life seems to be very taken up with that work and that group of people. His family (wife and three daughters) is in [facility]; he has not been home for 12 years. He was able to identify significant sadness and loneliness related to this. He has not started a new family here. He has some family in [place] and has some friends through his work. He stated he does go out and have fun, but not very often. He is usually too tired. He has no Mormon affiliation. His focus is to work hard and send money back to his family. He is very proud of his daughter who is studying to become a nurse. We  Explored his history with anger and emotion. He was able to identify feeling like he wanted to be left alone. This seems to happen a lot in the morning and last about half the day. It may be related to low blood sugar when he wakes up. I asked him to confer with the nutritionist about this. I provided psycho-education on the relation between mood and food. I also provided psycho-education on anger and the possible underlying emotions, such as sadness or loneliness or boredom. He expressed understanding verbally and non-verbally. We also worked on some breathing exercises he can do and developed some other ways he might distract himself. He expressed understanding by asking good questions and being able to repeat back to me what he could do for his anger and his sadness.    I do not think he needs a follow up appointment, but we discussed ways he can tell he might need to talk with me again and he agreed to make another appointment if the anger returns or his sadness worsens.

## 2017-05-04 NOTE — PROGRESS NOTES
S/w pt with  Pippa Esposito. Pt states he fell and hit his head 2 wks ago. , he passed out. Every since he have trouble thinking, Denies vision lost, blurred vision, vomiting. Advised pt that he needs to go the ER to evaluated. He may need possible imaging to make sure there is no injury. Pt verbalized understanding and agreed to go. Address given to [facility]. Pt states that he has stop taking his medication for his diabetes 20 days ago. Last rx was written in 8/2017 for 3 months, pt should have been out of medication, but he still has plenty left. Advised pt that he needs to restart his medication and needs to see the provider. Discussed the importance of taking medication as prescribed by provider and not to stop it. Pt agreed to restart medication. Appt made for pt to see DELFINO Gottlieb in June.

## 2017-06-08 ENCOUNTER — OFFICE VISIT (OUTPATIENT)
Dept: FAMILY MEDICINE CLINIC | Age: 47
End: 2017-06-08

## 2017-06-08 ENCOUNTER — HOSPITAL ENCOUNTER (OUTPATIENT)
Dept: LAB | Age: 47
Discharge: HOME | End: 2017-06-08

## 2017-06-08 VITALS
DIASTOLIC BLOOD PRESSURE: 67 MMHG | BODY MASS INDEX: 35.5 KG/M2 | TEMPERATURE: 98.5 F | WEIGHT: 185.6 LBS | SYSTOLIC BLOOD PRESSURE: 124 MMHG | HEART RATE: 91 BPM

## 2017-06-08 DIAGNOSIS — E11.65 TYPE 2 DIABETES MELLITUS WITH HYPERGLYCEMIA, UNSPECIFIED LONG TERM INSULIN USE STATUS: ICD-10-CM

## 2017-06-08 DIAGNOSIS — E11.65 TYPE 2 DIABETES MELLITUS WITH HYPERGLYCEMIA, UNSPECIFIED LONG TERM INSULIN USE STATUS: Primary | ICD-10-CM

## 2017-06-08 DIAGNOSIS — E11.65 TYPE 2 DIABETES MELLITUS WITH HYPERGLYCEMIA, WITHOUT LONG-TERM CURRENT USE OF INSULIN (HCC): ICD-10-CM

## 2017-06-08 LAB
ALBUMIN SERPL BCP-MCNC: 3.8 G/DL (ref 3.5–5)
ALBUMIN/GLOB SERPL: 1 {RATIO} (ref 1.1–2.2)
ALP SERPL-CCNC: 108 U/L (ref 45–117)
ALT SERPL-CCNC: 44 U/L (ref 12–78)
ANION GAP BLD CALC-SCNC: 14 MMOL/L (ref 5–15)
AST SERPL W P-5'-P-CCNC: 31 U/L (ref 15–37)
BILIRUB SERPL-MCNC: 0.4 MG/DL (ref 0.2–1)
BUN SERPL-MCNC: 11 MG/DL (ref 6–20)
BUN/CREAT SERPL: 12 (ref 12–20)
CALCIUM SERPL-MCNC: 9.1 MG/DL (ref 8.5–10.1)
CHLORIDE SERPL-SCNC: 111 MMOL/L (ref 97–108)
CHOLEST SERPL-MCNC: 205 MG/DL
CO2 SERPL-SCNC: 20 MMOL/L (ref 21–32)
CREAT SERPL-MCNC: 0.93 MG/DL (ref 0.7–1.3)
EST. AVERAGE GLUCOSE BLD GHB EST-MCNC: 143 MG/DL
GLOBULIN SER CALC-MCNC: 3.7 G/DL (ref 2–4)
GLUCOSE POC: NORMAL MG/DL
GLUCOSE SERPL-MCNC: 128 MG/DL (ref 65–100)
HBA1C MFR BLD: 6.6 % (ref 4.2–6.3)
HDLC SERPL-MCNC: 53 MG/DL
HDLC SERPL: 3.9 {RATIO} (ref 0–5)
LDLC SERPL CALC-MCNC: 74.6 MG/DL (ref 0–100)
LIPID PROFILE,FLP: ABNORMAL
POTASSIUM SERPL-SCNC: 4 MMOL/L (ref 3.5–5.1)
PROT SERPL-MCNC: 7.5 G/DL (ref 6.4–8.2)
SODIUM SERPL-SCNC: 145 MMOL/L (ref 136–145)
TRIGL SERPL-MCNC: 387 MG/DL (ref ?–150)
VLDLC SERPL CALC-MCNC: 77.4 MG/DL

## 2017-06-08 PROCEDURE — 84681 ASSAY OF C-PEPTIDE: CPT | Performed by: NURSE PRACTITIONER

## 2017-06-08 PROCEDURE — 80053 COMPREHEN METABOLIC PANEL: CPT | Performed by: NURSE PRACTITIONER

## 2017-06-08 PROCEDURE — 80061 LIPID PANEL: CPT | Performed by: NURSE PRACTITIONER

## 2017-06-08 PROCEDURE — 83036 HEMOGLOBIN GLYCOSYLATED A1C: CPT | Performed by: NURSE PRACTITIONER

## 2017-06-08 RX ORDER — METFORMIN HYDROCHLORIDE 500 MG/1
TABLET, EXTENDED RELEASE ORAL
Qty: 180 TAB | Refills: 0 | Status: SHIPPED | OUTPATIENT
Start: 2017-06-08

## 2017-06-08 NOTE — PROGRESS NOTES
Mr. Rox Rico was seen for F/U nutrition education. Genevievearleth Tolentino is interpreting for this appt. \"I fell better. \" Pt reports that he had an accident where he slipped and fell and consequently hit the back of his head. Has been having headaches. Taking Brayden-dol which is a pain reliever from his country. RD recommended that he see a doctor. Pt eating 2 meals/day    AM  Bread, whole wheat-4 slices  Egg  Coffee  splenda    PM  Chicken, fried, 2 pieces  Tortillas, 6  Soda, regular, 12oz    RD reviewed CHO foods and how they affect BG. Using food models, RD displayed common CHO foods for pt to see. Discussed how the models are the recommended portion sizes. RD emphasized that decreasing serving sizes of breads/tortillas will greatly help him in improving BG. Also, discussed choosing diet or sugar free beverages. Reviewed MyPlate and recommendations.  Pt agrees to decrease serving size of breads/tortillas to no more than 2 pieces/meal.

## 2017-06-08 NOTE — PROGRESS NOTES
Patient seen for discharge with the assistance of lili Murphy. We reviewed the AVS, Metformin prescription and the pharmacy location. Per provider instruction, the patient also signed the medical records request form to obtain results of any head scans done by [facility] on 04-16-17. The patient scheduled the one month NV appointment to review lab results that the provider requested. I spoke with the provider to clarify why the patient needed a NV appointment for lab results. Per  the provider, she also wanted some diabetes teaching done at the . Rachna Moreno 144 appointment. She then requested that the NV be scheduled in 2 weeks for 30 minutes and she will enter a note before then to explain what she would like the nurses to do for the patient at the . Rachna Moreno 144 appointment. The patient agreed and went back to registration to reschedule his NV.  Beryl Chandler RN

## 2017-06-08 NOTE — PROGRESS NOTES
The patient has with him today the discharge instructions given to him by [facility] for his visit on 04-16-17. A copy was made of pages 1-3, which included clinical data,  of the discharge instructions for the patient's chart. Pages 4-18 were not copied because they focused on patient teaching and were duplicated in [facility] [place] and [place].  Sid Balderas RN
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
## 2017-06-09 NOTE — PROGRESS NOTES
I would want to know this man's hemoglobin level to ensure his A1C is not falsely low. Trig high, which implies glucose is not controlled. I am waiting for c-peptide.

## 2017-06-10 LAB — C PEPTIDE SERPL-MCNC: 7.3 NG/ML (ref 1.1–4.4)

## 2017-06-12 ENCOUNTER — TELEPHONE (OUTPATIENT)
Dept: FAMILY MEDICINE CLINIC | Age: 47
End: 2017-06-12

## 2017-06-12 NOTE — TELEPHONE ENCOUNTER
Attempted reach patient, just rings, unable to leave vm. Patient needs to have his f/u changed from 6/22 to around 8/14 instead per provider. Please see Lelo Roman NP's note.

## 2017-06-12 NOTE — PROGRESS NOTES
Dr. Sigrid Irvin, is there clinical significance to a high c-peptide? He was supposed to return fasting.

## 2017-06-12 NOTE — PROGRESS NOTES
Usually a high c-peptide implies insulin resistance from Type 2 diabetes due to being overweight/obese and it appears his BMI is 35 so this would fit.

## 2017-06-12 NOTE — TELEPHONE ENCOUNTER
Patient has high levels of \"insulin resistance. \"  A 10 lb weight loss kept off permanently would help his diabetes, triglycerides, and overall health. No additional nurse teaching is needed at his follow up appointment. He can make a routine 3 month diabetes follow up appointment. If he has any symptoms or signs he is concerned about, I encourage him to return sooner to see a provider.

## 2017-06-13 NOTE — TELEPHONE ENCOUNTER
The pt was called using  [place]. The pt was told he had high levels of \" insulin resistance\" per provider and it would benefit him to lose 10lbs permanently. This would improve his overall health. The provider also stated the pt should return for DM follow up in 3 month's. The pt has appt 06/22/17 this appt was changed to 08/15/17 at 9:00am. The pt stated he only wanted to come to [facility] site. The appt was made with the assistance of the registrar [facility].  Mason Szymanski RN